# Patient Record
Sex: MALE | Race: WHITE | HISPANIC OR LATINO | URBAN - METROPOLITAN AREA
[De-identification: names, ages, dates, MRNs, and addresses within clinical notes are randomized per-mention and may not be internally consistent; named-entity substitution may affect disease eponyms.]

---
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
## 2019-05-20 NOTE — ED PROVIDER NOTE - CPE EDP SKIN NORM
If you are a smoker, it is important for your health to stop smoking. Please be aware that second hand smoke is also harmful.
RASH

## 2020-12-26 ENCOUNTER — EMERGENCY (EMERGENCY)
Facility: HOSPITAL | Age: 20
LOS: 0 days | Discharge: HOME | End: 2020-12-26
Attending: EMERGENCY MEDICINE
Payer: SELF-PAY

## 2020-12-26 VITALS
TEMPERATURE: 99 F | OXYGEN SATURATION: 98 % | HEART RATE: 88 BPM | RESPIRATION RATE: 18 BRPM | DIASTOLIC BLOOD PRESSURE: 79 MMHG | SYSTOLIC BLOOD PRESSURE: 136 MMHG

## 2020-12-26 VITALS
DIASTOLIC BLOOD PRESSURE: 73 MMHG | OXYGEN SATURATION: 98 % | RESPIRATION RATE: 19 BRPM | SYSTOLIC BLOOD PRESSURE: 131 MMHG | TEMPERATURE: 99 F | WEIGHT: 149.91 LBS | HEART RATE: 111 BPM

## 2020-12-26 DIAGNOSIS — M25.511 PAIN IN RIGHT SHOULDER: ICD-10-CM

## 2020-12-26 DIAGNOSIS — S43.014A ANTERIOR DISLOCATION OF RIGHT HUMERUS, INITIAL ENCOUNTER: ICD-10-CM
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
## 2020-12-26 PROCEDURE — 73130 X-RAY EXAM OF HAND: CPT | Mod: 26,RT

## 2020-12-26 PROCEDURE — 73020 X-RAY EXAM OF SHOULDER: CPT | Mod: RT

## 2020-12-26 PROCEDURE — 73030 X-RAY EXAM OF SHOULDER: CPT | Mod: RT

## 2020-12-26 PROCEDURE — 99285 EMERGENCY DEPT VISIT HI MDM: CPT | Mod: 25

## 2020-12-26 PROCEDURE — 23650 CLTX SHO DSLC W/MNPJ WO ANES: CPT | Mod: RT

## 2020-12-26 PROCEDURE — 73130 X-RAY EXAM OF HAND: CPT | Mod: RT

## 2020-12-26 PROCEDURE — 73030 X-RAY EXAM OF SHOULDER: CPT | Mod: 26,RT

## 2020-12-26 PROCEDURE — 73020 X-RAY EXAM OF SHOULDER: CPT | Mod: 26,59,RT

## 2020-12-26 PROCEDURE — 99284 EMERGENCY DEPT VISIT MOD MDM: CPT

## 2020-12-26 RX ORDER — IBUPROFEN 200 MG
400 TABLET ORAL ONCE
Refills: 0 | Status: COMPLETED | OUTPATIENT
Start: 2020-12-26 | End: 2020-12-26

## 2020-12-26 RX ADMIN — Medication 400 MILLIGRAM(S): at 15:03

## 2020-12-26 NOTE — ED PROVIDER NOTE - PATIENT PORTAL LINK FT
You can access the FollowMyHealth Patient Portal offered by [facility] by registering at the following website: [email or web address]. By joining [employer]’s FollowMyHealth portal, you will also be able to view your health information using other applications (apps) compatible with our system.

## 2020-12-26 NOTE — ED PROVIDER NOTE - CARE PROVIDER_API CALL
Erwin Rodrigez  [address], [address]  [place] [address]  Phone: [phone]  Fax: [phone]  Follow Up Time:

## 2020-12-26 NOTE — ED PROVIDER NOTE - NSFOLLOWUPINSTRUCTIONS_ED_ALL_ED_FT
Wear shoulder immobilizer as instructed until Orthopedic follow-up.  Motrin 200 mg 2 tabs 3 x a day with food as needed for pain.  Avoid lifting anything with right hand.      ED evaluation and management discussed with the patient and family (if available) in detail.  Close PMD follow up encouraged.  Strict ED return instructions discussed in detail and patient given the opportunity to ask any questions about their discharge diagnosis and instructions. Patient verbalized understanding.          Shoulder Dislocation    WHAT YOU NEED TO KNOW:    A shoulder dislocation happens when the top of your arm bone (humerus) moves out of the socket in your shoulder blade.    Shoulder Anatomy         DISCHARGE INSTRUCTIONS:    Return to the emergency department if:   •Your shoulder and arm become pale or cold.      •You cannot move your shoulder and arm.      •You have more redness or swelling in your shoulder.      •Your shoulder becomes dislocated again.      Call your doctor if:   •You have a fever.      •You have more pain in your shoulder and arm even after you rest and take your medicine.      •You have new weakness or numbness in your shoulder and arm.      •You have questions or concerns about your condition or care.      Medicines:You may need any of the following:   •Prescription pain medicine may be given. Ask your healthcare provider how to take this medicine safely. Some prescription pain medicines contain acetaminophen. Do not take other medicines that contain acetaminophen without talking to your healthcare provider. Too much acetaminophen may cause liver damage. Prescription pain medicine may cause constipation. Ask your healthcare provider how to prevent or treat constipation.       •A muscle relaxer may help the tight muscles in your shoulder relax.      •Take your medicine as directed. Contact your healthcare provider if you think your medicine is not helping or if you have side effects. Tell him or her if you are allergic to any medicine. Keep a list of the medicines, vitamins, and herbs you take. Include the amounts, and when and why you take them. Bring the list or the pill bottles to follow-up visits. Carry your medicine list with you in case of an emergency.      Rest your shoulder and arm: Rest helps your muscles and tissues heal. Avoid activities that cause pain or use an overhead arm motion. Your healthcare provider will tell you when it is safe to return to sports or other daily activities.    How to wear a brace, sling, or splint: A brace, sling, or splint may be needed to limit your movement and protect your shoulder.    Shoulder Sling     •Wear your brace, sling, or splint all the time. Take it off only to bathe or do exercises as directed. Ask your healthcare provider how many weeks you should wear it.      •Keep your skin clean and dry. Place padding under your armpit to help absorb sweat and prevent sores on your skin.      •Do not hunch your shoulders. This may cause pain. Keep your shoulders relaxed.      •Support your wrist and hand with the sling. Cover the knuckles on your hand with your sling. Your wrist should be positioned higher than your elbow. Your wrist may start to hurt or go numb if your sling is too short.      Apply ice: Ice helps decrease swelling and pain. Ice may also help prevent tissue damage. Use an ice pack, or put crushed ice in a plastic bag. Cover it with a towel before you place it on your shoulder. Apply ice for 15 to 20 minutes every hour or as directed.    Exercises: Begin gentle exercises as directed. After healing begins, you may start light exercises so your shoulder does not get stiff. Go to physical therapy if directed. A physical therapist teaches you exercises to help improve movement and strength, and to decrease pain. Do not lift heavy objects or do any exercise that causes severe pain.    Follow up with your doctor in 5 to 10 days: Write down your questions so you remember to ask them during your visits. ARIC IS FIT FOR CONFINEMENT.    Wear shoulder immobilizer as instructed until Orthopedic follow-up.  Follow up with orthopedist Dr. Rodrigez in 1 - 2 weeks.  Motrin 200 mg 2 tabs 3 x a day with food as needed for pain.  Avoid lifting anything with right hand.      ED evaluation and management discussed with the patient and family (if available) in detail.  Close PMD follow up encouraged.  Strict ED return instructions discussed in detail and patient given the opportunity to ask any questions about their discharge diagnosis and instructions. Patient verbalized understanding.          Shoulder Dislocation    WHAT YOU NEED TO KNOW:    A shoulder dislocation happens when the top of your arm bone (humerus) moves out of the socket in your shoulder blade.    Shoulder Anatomy         DISCHARGE INSTRUCTIONS:    Return to the emergency department if:   •Your shoulder and arm become pale or cold.      •You cannot move your shoulder and arm.      •You have more redness or swelling in your shoulder.      •Your shoulder becomes dislocated again.      Call your doctor if:   •You have a fever.      •You have more pain in your shoulder and arm even after you rest and take your medicine.      •You have new weakness or numbness in your shoulder and arm.      •You have questions or concerns about your condition or care.      Medicines:You may need any of the following:   •Prescription pain medicine may be given. Ask your healthcare provider how to take this medicine safely. Some prescription pain medicines contain acetaminophen. Do not take other medicines that contain acetaminophen without talking to your healthcare provider. Too much acetaminophen may cause liver damage. Prescription pain medicine may cause constipation. Ask your healthcare provider how to prevent or treat constipation.       •A muscle relaxer may help the tight muscles in your shoulder relax.      •Take your medicine as directed. Contact your healthcare provider if you think your medicine is not helping or if you have side effects. Tell him or her if you are allergic to any medicine. Keep a list of the medicines, vitamins, and herbs you take. Include the amounts, and when and why you take them. Bring the list or the pill bottles to follow-up visits. Carry your medicine list with you in case of an emergency.      Rest your shoulder and arm: Rest helps your muscles and tissues heal. Avoid activities that cause pain or use an overhead arm motion. Your healthcare provider will tell you when it is safe to return to sports or other daily activities.    How to wear a brace, sling, or splint: A brace, sling, or splint may be needed to limit your movement and protect your shoulder.    Shoulder Sling     •Wear your brace, sling, or splint all the time. Take it off only to bathe or do exercises as directed. Ask your healthcare provider how many weeks you should wear it.      •Keep your skin clean and dry. Place padding under your armpit to help absorb sweat and prevent sores on your skin.      •Do not hunch your shoulders. This may cause pain. Keep your shoulders relaxed.      •Support your wrist and hand with the sling. Cover the knuckles on your hand with your sling. Your wrist should be positioned higher than your elbow. Your wrist may start to hurt or go numb if your sling is too short.      Apply ice: Ice helps decrease swelling and pain. Ice may also help prevent tissue damage. Use an ice pack, or put crushed ice in a plastic bag. Cover it with a towel before you place it on your shoulder. Apply ice for 15 to 20 minutes every hour or as directed.    Exercises: Begin gentle exercises as directed. After healing begins, you may start light exercises so your shoulder does not get stiff. Go to physical therapy if directed. A physical therapist teaches you exercises to help improve movement and strength, and to decrease pain. Do not lift heavy objects or do any exercise that causes severe pain.    Follow up with your doctor in 5 to 10 days: Write down your questions so you remember to ask them during your visits.

## 2020-12-26 NOTE — ED PROVIDER NOTE - PROGRESS NOTE DETAILS
[facility] [place] for ED attending, Dr. Christensen: Ortho resident aware of ED consult. Dr. Christensen:  Orthopedic consult appreciated: ID XRs + reduction, no fx.  Pt stable for d/C.

## 2020-12-26 NOTE — ED PROVIDER NOTE - MUSCULOSKELETAL, MLM
Neck is non tender, supple, Back non tender, stable. Chest wall is non tender, stable. +R shoulder with swelling, tenderness with decreased AFROM secondary to pain. RUE distal with normal sensory/motor exam. All other extremities without swelling or tenderness. Neck is non tender, supple, Back non tender, stable. Chest wall is non tender, stable. +R shoulder with swelling, tenderness with decreased AFROM secondary to pain. RUE distal with normal sensory/motor exam. Normal radial pulses. All other extremities without swelling or tenderness.
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
## 2020-12-26 NOTE — CONSULT NOTE ADULT - ASSESSMENT
A/P: 20 y male with right traumatic shoulder dislocation  - Post reduction films showing well aligned GH joint  - NWB RUE in sling immobilization  - Pain control  - Follow up outpatient with Dr. Rodrigez in 1-2 weeks. Call to schedule an appt  - Discussed with Dr. Engel who recommended follow up with Dr. Rodrigez  - Ortho stable for d/c

## 2020-12-26 NOTE — ED ADULT NURSE NOTE - NSIMPLEMENTINTERV_GEN_ALL_ED
Implemented All Fall Risk Interventions:  [place] to call system. Call bell, personal items and telephone within reach. Instruct patient to call for assistance. Room bathroom lighting operational. Non-slip footwear when patient is off stretcher. Physically safe environment: no spills, clutter or unnecessary equipment. Stretcher in lowest position, wheels locked, appropriate side rails in place. Provide visual cue, wrist band, yellow gown, etc. Monitor gait and stability. Monitor for mental status changes and reorient to person, place, and time. Review medications for side effects contributing to fall risk. Reinforce activity limits and safety measures with patient and family.
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
## 2020-12-26 NOTE — ED PROVIDER NOTE - ENMT, MLM
Airway patent. Oropharynx clear. Mucous membranes moist. Normal cephalic, atraumatic. No clinical evidence of facial injury.

## 2022-04-24 ENCOUNTER — EMERGENCY (EMERGENCY)
Facility: HOSPITAL | Age: 22
LOS: 0 days | Discharge: HOME | End: 2022-04-24
Attending: EMERGENCY MEDICINE
Payer: SELF-PAY

## 2022-04-24 VITALS
HEART RATE: 93 BPM | RESPIRATION RATE: 18 BRPM | WEIGHT: 158.95 LBS | SYSTOLIC BLOOD PRESSURE: 153 MMHG | TEMPERATURE: 99 F | HEIGHT: 67 IN | OXYGEN SATURATION: 100 % | DIASTOLIC BLOOD PRESSURE: 87 MMHG

## 2022-04-24 VITALS
OXYGEN SATURATION: 99 % | SYSTOLIC BLOOD PRESSURE: 130 MMHG | DIASTOLIC BLOOD PRESSURE: 76 MMHG | TEMPERATURE: 99 F | RESPIRATION RATE: 18 BRPM | HEART RATE: 91 BPM

## 2022-04-24 DIAGNOSIS — R45.850 HOMICIDAL IDEATIONS: ICD-10-CM

## 2022-04-24 DIAGNOSIS — F43.20 ADJUSTMENT DISORDER, UNSPECIFIED: ICD-10-CM

## 2022-04-24 DIAGNOSIS — Z20.822 CONTACT WITH AND (SUSPECTED) EXPOSURE TO COVID-19: ICD-10-CM

## 2022-04-24 LAB
ALBUMIN SERPL ELPH-MCNC: 4.4 G/DL — SIGNIFICANT CHANGE UP (ref 3.3–5)
ALP SERPL-CCNC: 79 U/L — SIGNIFICANT CHANGE UP (ref 40–120)
ALT FLD-CCNC: 26 U/L — SIGNIFICANT CHANGE UP (ref 12–78)
ANION GAP SERPL CALC-SCNC: 11 MMOL/L — SIGNIFICANT CHANGE UP (ref 5–17)
AST SERPL-CCNC: 25 U/L — SIGNIFICANT CHANGE UP (ref 15–37)
BASOPHILS # BLD AUTO: 0.01 K/UL — SIGNIFICANT CHANGE UP (ref 0–0.2)
BASOPHILS NFR BLD AUTO: 0.1 % — SIGNIFICANT CHANGE UP (ref 0–2)
BILIRUB SERPL-MCNC: 0.3 MG/DL — SIGNIFICANT CHANGE UP (ref 0.2–1.2)
BUN SERPL-MCNC: 11 MG/DL — SIGNIFICANT CHANGE UP (ref 7–23)
CALCIUM SERPL-MCNC: 9.2 MG/DL — SIGNIFICANT CHANGE UP (ref 8.5–10.1)
CHLORIDE SERPL-SCNC: 105 MMOL/L — SIGNIFICANT CHANGE UP (ref 96–108)
CO2 SERPL-SCNC: 27 MMOL/L — SIGNIFICANT CHANGE UP (ref 22–31)
CREAT SERPL-MCNC: 1.08 MG/DL — SIGNIFICANT CHANGE UP (ref 0.5–1.3)
EGFR: 100 ML/MIN/1.73M2 — SIGNIFICANT CHANGE UP
EOSINOPHIL # BLD AUTO: 0.03 K/UL — SIGNIFICANT CHANGE UP (ref 0–0.5)
EOSINOPHIL NFR BLD AUTO: 0.3 % — SIGNIFICANT CHANGE UP (ref 0–6)
ETHANOL SERPL-MCNC: <10 MG/DL — SIGNIFICANT CHANGE UP (ref 0–10)
GLUCOSE SERPL-MCNC: 102 MG/DL — HIGH (ref 70–99)
HCT VFR BLD CALC: 42 % — SIGNIFICANT CHANGE UP (ref 39–50)
HGB BLD-MCNC: 13.9 G/DL — SIGNIFICANT CHANGE UP (ref 13–17)
IMM GRANULOCYTES NFR BLD AUTO: 0.4 % — SIGNIFICANT CHANGE UP (ref 0–1.5)
LYMPHOCYTES # BLD AUTO: 2.89 K/UL — SIGNIFICANT CHANGE UP (ref 1–3.3)
LYMPHOCYTES # BLD AUTO: 25.9 % — SIGNIFICANT CHANGE UP (ref 13–44)
MCHC RBC-ENTMCNC: 29.5 PG — SIGNIFICANT CHANGE UP (ref 27–34)
MCHC RBC-ENTMCNC: 33.1 GM/DL — SIGNIFICANT CHANGE UP (ref 32–36)
MCV RBC AUTO: 89.2 FL — SIGNIFICANT CHANGE UP (ref 80–100)
MONOCYTES # BLD AUTO: 0.66 K/UL — SIGNIFICANT CHANGE UP (ref 0–0.9)
MONOCYTES NFR BLD AUTO: 5.9 % — SIGNIFICANT CHANGE UP (ref 2–14)
NEUTROPHILS # BLD AUTO: 7.52 K/UL — HIGH (ref 1.8–7.4)
NEUTROPHILS NFR BLD AUTO: 67.4 % — SIGNIFICANT CHANGE UP (ref 43–77)
PLATELET # BLD AUTO: 302 K/UL — SIGNIFICANT CHANGE UP (ref 150–400)
POTASSIUM SERPL-MCNC: 4 MMOL/L — SIGNIFICANT CHANGE UP (ref 3.5–5.3)
POTASSIUM SERPL-SCNC: 4 MMOL/L — SIGNIFICANT CHANGE UP (ref 3.5–5.3)
PROT SERPL-MCNC: 8.1 GM/DL — SIGNIFICANT CHANGE UP (ref 6–8.3)
RBC # BLD: 4.71 M/UL — SIGNIFICANT CHANGE UP (ref 4.2–5.8)
RBC # FLD: 12.7 % — SIGNIFICANT CHANGE UP (ref 10.3–14.5)
SODIUM SERPL-SCNC: 143 MMOL/L — SIGNIFICANT CHANGE UP (ref 135–145)
WBC # BLD: 11.16 K/UL — HIGH (ref 3.8–10.5)
WBC # FLD AUTO: 11.16 K/UL — HIGH (ref 3.8–10.5)

## 2022-04-24 PROCEDURE — 80307 DRUG TEST PRSMV CHEM ANLYZR: CPT

## 2022-04-24 PROCEDURE — 80053 COMPREHEN METABOLIC PANEL: CPT

## 2022-04-24 PROCEDURE — 90792 PSYCH DIAG EVAL W/MED SRVCS: CPT | Mod: 95

## 2022-04-24 PROCEDURE — 93010 ELECTROCARDIOGRAM REPORT: CPT

## 2022-04-24 PROCEDURE — U0005: CPT

## 2022-04-24 PROCEDURE — 36415 COLL VENOUS BLD VENIPUNCTURE: CPT

## 2022-04-24 PROCEDURE — U0003: CPT

## 2022-04-24 PROCEDURE — 99284 EMERGENCY DEPT VISIT MOD MDM: CPT

## 2022-04-24 PROCEDURE — 85025 COMPLETE CBC W/AUTO DIFF WBC: CPT

## 2022-04-24 PROCEDURE — 93005 ELECTROCARDIOGRAM TRACING: CPT
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
## 2022-04-24 NOTE — ED PROVIDER NOTE - PHYSICAL EXAMINATION
Constitutional: NAD AAOx3  Eyes: PERRLA EOMI  Head: Normocephalic atraumatic  Mouth: MMM  Cardiac: regular rate   Resp: Lungs CTAB  GI: Abd s/nt/nd  Neuro: CN2-12 intact  Extremities: Intact distal pulses b/l, no calf tenderness, normal ROM b/l UE and LE   Skin: No rashes   Psych: Denies SI/HI

## 2022-04-24 NOTE — ED ADULT NURSE REASSESSMENT NOTE - NS ED NURSE REASSESS COMMENT FT1
Report received from KALEB Martinez. Patient resting in stretcher with no complaints of pain or discomfort at this time.  at bedside. Vital signs obtained. Patient being discharged, awaiting discharge paperwork. Safety maintained.

## 2022-04-24 NOTE — ED ADULT NURSE NOTE - CAS TRG GENERAL AIRWAY, MLM
Responded to BRT called pt is pulling out IV lines and not cooperating with staff.  Nurse requesting psych. Involvement.  Sent perfect serve page out to Dr. Rosales with rn, Rojelio contact number .   Patent
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
## 2022-04-24 NOTE — ED PROVIDER NOTE - PATIENT PORTAL LINK FT
You can access the FollowMyHealth Patient Portal offered by [facility] by registering at the following website: [email or web address]. By joining [employer]’s FollowMyHealth portal, you will also be able to view your health information using other applications (apps) compatible with our system.
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
## 2022-04-24 NOTE — ED PROVIDER NOTE - NS ED ROS FT
Constitutional: No fever or chills  Eyes: No visual changes  HEENT: No throat pain  CV: No chest pain  Resp: No SOB no cough  GI: No abd pain, nausea or vomiting  : No dysuria  MSK: No musculoskeletal pain  Skin: No rash  Neuro: No headache   Psych: +HI
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
## 2022-04-24 NOTE — ED BEHAVIORAL HEALTH ASSESSMENT NOTE - DESCRIPTION
Pt calm and cooperative; under arrest    T(C): 37.3 (04-24-22 @ 16:09)  T(F): 99.2 (04-24-22 @ 16:09), Max: 99.2 (04-24-22 @ 16:09)  HR: 93 (04-24-22 @ 16:09) (93 - 93)  BP: 153/87 (04-24-22 @ 16:09) (153/87 - 153/87)  RR:  (18 - 18)  SpO2:  (100% - 100%)  Wt(kg): -- See HPI denies

## 2022-04-25 PROBLEM — Z78.9 OTHER SPECIFIED HEALTH STATUS: Chronic | Status: ACTIVE | Noted: 2020-12-26

## 2022-04-25 LAB — SARS-COV-2 RNA SPEC QL NAA+PROBE: SIGNIFICANT CHANGE UP

## 2022-11-25 ENCOUNTER — NON-APPOINTMENT (OUTPATIENT)
Age: 22
End: 2022-11-25
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
## 2024-08-23 ENCOUNTER — EMERGENCY (EMERGENCY)
Facility: HOSPITAL | Age: 24
LOS: 0 days | Discharge: HOME | End: 2024-08-23
Attending: STUDENT IN AN ORGANIZED HEALTH CARE EDUCATION/TRAINING PROGRAM
Payer: SELF-PAY

## 2024-08-23 VITALS
SYSTOLIC BLOOD PRESSURE: 138 MMHG | WEIGHT: 169.54 LBS | TEMPERATURE: 99 F | HEART RATE: 95 BPM | DIASTOLIC BLOOD PRESSURE: 90 MMHG | RESPIRATION RATE: 16 BRPM | OXYGEN SATURATION: 99 %

## 2024-08-23 VITALS
TEMPERATURE: 99 F | OXYGEN SATURATION: 99 % | DIASTOLIC BLOOD PRESSURE: 80 MMHG | HEART RATE: 87 BPM | RESPIRATION RATE: 18 BRPM | SYSTOLIC BLOOD PRESSURE: 124 MMHG

## 2024-08-23 DIAGNOSIS — K13.79 OTHER LESIONS OF ORAL MUCOSA: ICD-10-CM

## 2024-08-23 DIAGNOSIS — R21 RASH AND OTHER NONSPECIFIC SKIN ERUPTION: ICD-10-CM

## 2024-08-23 DIAGNOSIS — A63.0 ANOGENITAL (VENEREAL) WARTS: ICD-10-CM

## 2024-08-23 DIAGNOSIS — N48.9 DISORDER OF PENIS, UNSPECIFIED: ICD-10-CM

## 2024-08-23 PROCEDURE — 99284 EMERGENCY DEPT VISIT MOD MDM: CPT

## 2024-08-23 PROCEDURE — 99283 EMERGENCY DEPT VISIT LOW MDM: CPT

## 2024-08-23 RX ORDER — DIPHENHYDRAMINE HYDROCHLORIDE AND LIDOCAINE HYDROCHLORIDE AND ALUMINUM HYDROXIDE AND MAGNESIUM HYDRO
10 KIT
Qty: 1 | Refills: 0
Start: 2024-08-23 | End: 2024-08-27

## 2024-08-23 RX ORDER — LIDOCAINE 5% 5 G/100G
2 CREAM TOPICAL ONCE
Refills: 0 | Status: DISCONTINUED | OUTPATIENT
Start: 2024-08-23 | End: 2024-08-23

## 2024-08-23 RX ORDER — DIPHENHYDRAMINE HYDROCHLORIDE AND LIDOCAINE HYDROCHLORIDE AND ALUMINUM HYDROXIDE AND MAGNESIUM HYDRO
10 KIT ONCE
Refills: 0 | Status: COMPLETED | OUTPATIENT
Start: 2024-08-23 | End: 2024-08-23

## 2024-08-23 RX ORDER — VALACYCLOVIR 500 MG/1
1 TABLET, FILM COATED ORAL
Qty: 6 | Refills: 0
Start: 2024-08-23 | End: 2024-08-28

## 2024-08-23 RX ORDER — LIDOCAINE 5% 5 G/100G
1 CREAM TOPICAL ONCE
Refills: 0 | Status: COMPLETED | OUTPATIENT
Start: 2024-08-23 | End: 2024-08-23

## 2024-08-23 RX ORDER — VALACYCLOVIR 500 MG/1
1000 TABLET, FILM COATED ORAL ONCE
Refills: 0 | Status: COMPLETED | OUTPATIENT
Start: 2024-08-23 | End: 2024-08-23

## 2024-08-23 RX ADMIN — DIPHENHYDRAMINE HYDROCHLORIDE AND LIDOCAINE HYDROCHLORIDE AND ALUMINUM HYDROXIDE AND MAGNESIUM HYDRO 10 MILLILITER(S): KIT at 21:25

## 2024-08-23 RX ADMIN — LIDOCAINE 5% 1 APPLICATION(S): 5 CREAM TOPICAL at 21:34

## 2024-08-23 RX ADMIN — VALACYCLOVIR 1000 MILLIGRAM(S): 500 TABLET, FILM COATED ORAL at 21:22

## 2024-08-23 NOTE — ED STATDOCS - OBJECTIVE STATEMENT
23 year old male with no pertinent PMHx; presents to ED c/o rash on B/L arms, torso, buttocks, groin, lips. Endorsing fever/chills, rash with mild pain but no itching. Not currently sexually active, last active 2 months ago.

## 2024-08-23 NOTE — ED ADULT TRIAGE NOTE - CHIEF COMPLAINT QUOTE
PT presents to the ED with rash on buttocks, gential area, and lips. pt complains of generalized pain. possible fever. no othwe complaints at this time . pt well appearing ambulatory. NKDA

## 2024-08-23 NOTE — ED STATDOCS - CARE PROVIDER_API CALL
Ching Gannon  Colon/Rectal Surgery  [address], [place] [address]  Phone: [phone]  Fax: [phone]  Follow Up Time:     Ha Beltran  Infectious Disease  [address], [address] [address]  Phone: [phone]  Fax: [phone]  Follow Up Time:

## 2024-08-23 NOTE — ED STATDOCS - CARE PLAN
1 Principal Discharge DX:	Other lesions of oral mucosa  Secondary Diagnosis:	Penile lesion  Secondary Diagnosis:	Anal warts

## 2024-08-23 NOTE — ED STATDOCS - PATIENT PORTAL LINK FT
You can access the FollowMyHealth Patient Portal offered by [facility] by registering at the following website: [email or web address]. By joining [facility]’s FollowMyHealth portal, you will also be able to view your health information using other applications (apps) compatible with our system.

## 2024-08-23 NOTE — ED STATDOCS - CLINICAL SUMMARY MEDICAL DECISION MAKING FREE TEXT BOX
23-year-old male presenting with painful lesions of the mouth and penis as well as warts of the anus.  Sexually active, last 2 months ago.  No history of STDs.  Plan for antiviral given concern for HSV, general surgery and ID follow-up for HPV.

## 2024-08-23 NOTE — ED STATDOCS - PHYSICAL EXAMINATION
GENERAL: A&Ox4, non-toxic appearing, no acute distress  HEENT: NCAT, EOMI, oral mucosa moist, normal conjunctiva, aphthous ulcerations of the lower lip  RESP: no respiratory distress, speaking in full sentences  CV: RRR  MSK: no visible deformities  NEURO: no focal sensory or motor deficits, CN 2-12 grossly intact  SKIN: warm, normal color, well perfused, hives of the right upper extremity, condyloma acuminatum of the perirectal area, ulcerations of the right side of the penis under foreskin  PSYCH: normal affect

## 2024-08-23 NOTE — ED STATDOCS - PROGRESS NOTE DETAILS
signed Nesha Shaw PA-C Pt seen and evaluated initially in intake by Dr. Parada.   23M with painful oral and anal lesions, likely herpes. Pt also with anal warts. rx valtrex and magic mouthwash. outpt f/u colorectal and ID.

## 2024-08-23 NOTE — ED ADULT NURSE NOTE - OBJECTIVE STATEMENT
PT presents to the ED with rash on buttocks, gential area, and lips. pt complains of generalized pain. possible fever. no other complaints at this time . pt well appearing ambulatory.

## 2024-08-23 NOTE — ED STATDOCS - NSFOLLOWUPINSTRUCTIONS_ED_ALL_ED_FT
FOLLOW UP WITH COLORECTAL AND INFECTIOUS DISEASE THIS WEEK. CALL THE OFFICE TO MAKE AN APPOINTMENT. RETURN TO ER FOR ANY WORSENING SYMPTOMS OR NEW CONCERNS.    Oral Herpes Infection    WHAT YOU NEED TO KNOW:    What is an oral herpes infection? Oral herpes is a sexually transmitted infection (STI) caused by the herpes simplex virus (HSV). HSV has 2 types. An oral HSV infection is usually caused by HSV type 1. HSV type 2 usually affects the genital area but may also affect the mouth.    What increases my risk for an oral HSV infection?    Close contact with someone who has an HSV infection    A weak immune system caused by an illness or condition such as a cold, flu, or HIV    Shared items such as eating utensils, towels, or lip balm    Fatigue (mentally and physically tired), stress, or injury to your mouth or lips    Hormone changes during a person's monthly period    Exposure to extreme temperatures  What are the signs and symptoms of an oral HSV infection? You may not have any signs or symptoms. Signs and symptoms that do develop may appear suddenly and last 5 days to 2 weeks. Blisters may form on your mouth, lips, or gums. The blisters may burst or join together to form large open sores. Sores on your lips or face will then dry up (crust over). You may also have any of the following:    Burning, tingling, itching, or pain at the affected area before sores form    Fever, chills, or a headache    A sore throat or swollen lymph nodes in your neck    More tired and irritated than normal for you    Trouble eating, drinking, or speaking because of your mouth pain    Red, swollen, and bleeding gums  How is an oral HSV infection diagnosed? Your healthcare provider will examine your blisters or sores. Your provider may ask if you have other medical conditions. Tell your provider when the blisters or sores started, and about your other symptoms. You may need any of the following:    Skin or fluid samples may be tested for HSV. Your provider may swab fluid from a blister. Your provider may also scrape skin from a sore in your mouth. Skin scraping needs to be done within 48 hours of the first symptom, when the outbreak is most severe.    Blood tests may show you have developed antibodies from exposure to HSV. Blood tests may also show which type of HSV you have.  How is an oral HSV infection treated? An HSV infection cannot be cured. The blisters usually heal on their own within 10 days. Blisters may go away and come back several times. An oral HSV infection that comes back is also known as a cold sore. You may need any of the following:    Antiviral medicine helps relieve symptoms and shortens the amount of time you have blisters or sores. You may also need to take antiviral medicine daily to prevent outbreaks.    Pain medicine may be recommended if you have trouble eating or drinking because of the pain. The medicine may be given as a mouth rinse. Use it as directed by your healthcare provider.  How can I manage my symptoms?    Eat soft, plain foods until your sores heal. Foods such as eggs, yogurt, soup, rice, and pasta may be easier for you to eat. Do not eat crunchy, dry, salty, or spicy foods such as dry toast, popcorn, or chips. Do not have foods or drinks that contain citric acid, such as grapefruit or orange juice.    Drink cool liquids to help decrease mouth pain. Do not have carbonated liquids, such as soft drinks or sparkling water. A straw may help you drink more easily if you have blisters on your lips or tongue.    Use ice to help reduce swelling or pain. Use an ice pack, or put crushed ice in a plastic bag. Cover the bag with a towel before you place it on your lip. Apply ice for 15 to 20 minutes every hour, or as directed.  How can I prevent the spread of HSV?    Avoid close contact with others until your blisters or sores heal. Close contact includes touching, kissing, and oral sex.    Do not share items with anyone. Examples include eating utensils, towels, and lip balm.    Do not touch your sores, blisters, or scabs. The virus may spread from your fingers.    Wash your hands often. Use soap and water. Use germ-killing gel if soap and water are not available.  Handwashing  When should I seek immediate care?    You see fluid that is not clear coming from the sores.    You have changes in your vision or sudden eye pain.    You have eye pain when you look into bright lights.    You have sores on your eyes.    You have abdominal pain, a severe headache, or confusion.  When should I call my doctor?    Your symptoms get worse or have not gone away within 14 days.    You have trouble eating, drinking, or talking because of your mouth pain.    You are nauseated, or you vomit.    Your eyes feel irritated, or you feel like you have something in your eye.    You have questions or concerns about your condition or care.  CARE AGREEMENT:    You have the right to help plan your care. Learn about your health condition and how it may be treated. Discuss treatment options with your healthcare providers to decide what care you want to receive. You always have the right to refuse treatment.    Genital Warts  Genital warts are a common sexually transmitted infection (STI). They can be easily passed from person to person (transmitted). The warts may appear as small bumps on the skin near the genitals and the opening of the butt (anus). They are caused by an infection with a type of the virus human papillomavirus (HPV).    What are the causes?  This condition is caused by HPV. HPV is spread by having sex without protection with someone who has the infection. It can be spread through vaginal, anal, or oral sex.    What increases the risk?  You are more likely to get genital warts if:  You have direct contact with a person with HPV.  You have sex without using protection. This includes oral, vaginal, and anal sex.  You have more than one sexual partner.  You are male and not circumcised.  You have a male sexual partner who is not circumcised.  You have a weak body defense system (immune system). This may be because of a disease or medicine.  What are the signs or symptoms?  Outline of a person's lower body with a close-up view of genital warts on the skin.  Many people do not know that they are infected with HPV or have genital warts. Symptoms of genital warts may include:  Small growths around the groin, genitals, or anus.  The growths may vary in size and shape.  They may be flat, dome-shaped, or shaped like cauliflower.  They are often the same color as your skin.  Tell your health care provider if you have:  Irritated skin near the genitals or anus. This includes on the groin. You may have itching, pain, or bleeding.  Genital warts that look odd, bleed, turn into open sores (ulcers), or change in color.  Pain during sex.  How is this diagnosed?  Genital warts may be diagnosed based on your symptoms and a physical exam. You may also have other tests, such as:  A biopsy. This is when a piece of tissue is removed for testing and looked at under a microscope.  In females:  An HPV test is similar to a Pap test. It involves taking cells from the cervix. It may be done at the same time as a Pap test.  Colposcopy. This is a test done to look at the vagina and the lowest part of the uterus (cervix).  How is this treated?  There is no treatment for HPV. But there are treatments for the warts caused by HPV. You may need:  Medicines. These include solutions or creams that you apply to your skin (topical).  Procedures to remove the warts. These include:  Cryotherapy. This is when the warts are frozen off with liquid nitrogen.  Burning the warts with a laser (laser treatment) or electric probe (electrocautery).  Surgery.  If left untreated, the warts may go away on their own, stay the same, or increase in size and number.    Follow these instructions at home:  Medicines    A tube of ointment.  Apply over-the-counter and prescription medicines only as told by your provider.  Do not treat genital warts with medicines that are used for treating hand or foot warts.  Talk with your provider about using over-the-counter creams to treat itching.  General instructions    Do not touch or scratch the warts.  Tell your current and past sexual partners about the warts. They may also need treatment.  If you are female, get regular Pap and HPV tests as told by your provider.  If you become pregnant, tell your provider that you have had genital warts. They will need to watch you closely during pregnancy. Warts may spread or grow faster when you are pregnant. In rare cases, warts may spread to a baby during birth.  Keep all follow-up visits. Your provider will watch you closely. Some types of HPV increase the risk for cervical cancer.  How is this prevented?  To prevent genital warts:  Use a new latex or polyurethane condom for each sex act.  Limit how many sexual partners you have.  Have a sex partner who does not have other sex partners.  Talk with your sexual partners about their health.  Get the HPV vaccine. It is recommended for people who are 9–26 years old. It can prevent infection with the types of HPV that are linked to genital warts and cancer.  Where to find more information  Centers for Disease Control and Prevention (CDC): cdc.gov  Contact a health care provider if:  You have redness, swelling, or pain where your skin is being treated for the warts.  You have irritation or itching in your groin, genitals, or anus.  You feel lumps in or around your genitals or anus.  You have bleeding near your genitals or anus.  You have pain during sex, or you bleed after sex.  This information is not intended to replace advice given to you by your health care provider. Make sure you discuss any questions you have with your health care provider.

## 2024-10-01 NOTE — ED PROVIDER NOTE - MUSCULOSKELETAL, MLM
Pulmonary hypertension Spine appears normal, range of motion is not limited, no muscle or joint tenderness

## 2025-01-27 NOTE — ED ADULT NURSE NOTE - CHIEF COMPLAINT
The patient is a 21y Male complaining of homicidal thoughts.
Former smoker
Transposition Flap Text: The defect edges were debeveled with a #15 scalpel blade. Given the location of the defect and the proximity to free margins a transposition flap was deemed most appropriate. Using a sterile surgical marker, an appropriate transposition flap was drawn incorporating the defect. The area thus outlined was incised deep to adipose tissue with a #15 scalpel blade. The skin margins were undermined to an appropriate distance in all directions utilizing iris scissors. Following this, the designed flap was placed into the primary defect and sutured into place.

## 2025-03-04 NOTE — ED ADULT NURSE NOTE - NS ED NURSE DISCH DISPOSITION
His seasonal allergies are worsening.  The patient was started on Singulair today to be taken as directed.  
Hypertension is stable and controlled  Continue current treatment regimen.  Dietary sodium restriction.  Weight loss.  Blood pressure will be reassessed in 6 months.  
Patient's (Body mass index is 33.44 kg/m².) indicates that they are obese (BMI >30) with health conditions that include hypertension and dyslipidemias . Weight is worsening. BMI  is above average; BMI management plan is completed. We discussed low calorie, low carb based diet program, portion control, and increasing exercise.   
The patient was encouraged to always wear their seatbelt and never text and drive.  They were encouraged to get 7 to 8 hours sleep at night.  They were encouraged to exercise on a regular basis.  Screening labs were reviewed at today's visit and manage according to findings.    
Discharged